# Patient Record
Sex: FEMALE | Race: ASIAN
[De-identification: names, ages, dates, MRNs, and addresses within clinical notes are randomized per-mention and may not be internally consistent; named-entity substitution may affect disease eponyms.]

---

## 2018-01-20 ENCOUNTER — HOSPITAL ENCOUNTER (EMERGENCY)
Dept: HOSPITAL 25 - UCEAST | Age: 63
Discharge: HOME | End: 2018-01-20
Payer: COMMERCIAL
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
## 2018-01-20 DIAGNOSIS — Z90.49: ICD-10-CM

## 2018-01-20 DIAGNOSIS — S60.511A: Primary | ICD-10-CM

## 2018-01-20 DIAGNOSIS — Z23: ICD-10-CM

## 2018-01-20 PROCEDURE — 90715 TDAP VACCINE 7 YRS/> IM: CPT

## 2018-01-20 PROCEDURE — G0463 HOSPITAL OUTPT CLINIC VISIT: HCPCS

## 2018-01-20 PROCEDURE — 99213 OFFICE O/P EST LOW 20 MIN: CPT

## 2018-01-20 NOTE — UC
Bite Injury/Animal HPI





- HPI Summary


HPI Summary: 





Encounter conducted in Mandarin Chinese.  Hx dog bite this evening as she was 

feeding it out of palm of her right hand. This was a [place] davis which is 

very friendly and overexcited about food. Dog has all rabies vaccines up to 

date. Patient denies PMH or medications





- History of Current Complaint


Chief Complaint: UCBiteInjury


Stated Complaint: DOG BITE


Time Seen by Provider: 01/20/18 19:21


Hx Obtained From: Patient


Pregnant?: No


Severity Currently: Mild


Severity Initially: Mild


Pain Scale Used: 0-10 Numeric - 3


Onset/Duration: Sudden Onset


Type of Bite: Animal


Has Animal Been Immunized?: Yes


Character: Abrasion/Laceration


Aggravating Factor(s): Exertion


Alleviating Factor(s): Rest


Hx of Bite: Provoked by: - feeding, accidental


Animal Available for Observation: Yes


Animal Control Notified: No





- Risk Factors


Infection/Sepsis Risk Factors: Negative





- Allergies/Home Medications


Allergies/Adverse Reactions: 


 Allergies











Allergy/AdvReac Type Severity Reaction Status Date / Time


 


No Known Allergies Allergy   Verified 01/20/18 19:07











Home Medications: 


 Home Medications





Omega-3 Fatty Acids [Fish Oil] 1,000 mg PO 01/20/18 [History]











PMH/Surg Hx/FS Hx/Imm Hx


Previously Healthy: Yes





- Surgical History


Surgical History: Yes


Surgery Procedure, Year, and Place: [facility]





- Social History


Alcohol Use: None


Substance Use Type: None


Smoking Status (MU): Never Smoked Tobacco





Review of Systems


Constitutional: Negative


Skin: Other - wound hand


Is Patient Immunocompromised?: No


All Other Systems Reviewed And Are Negative: Yes





Physical Exam


Triage Information Reviewed: Yes


Appearance: Well-Appearing


Vital Signs Reviewed: Yes


Eye Exam: Normal


Neck exam: Normal


Respiratory Exam: Normal


Skin Exam: Other - two abrasions on palm of right hand about 7mm length, no 

bruises.  Distal pulses present, capillary refill less 2 sec





Bite Injury Course/Dx





- Course


Course Of Treatment: keep wound clean, continue bacitracin ointment, take 

antibiotics as prescribed.





- Differential Dx/Diagnosis


Provider Diagnoses: Dog bite.  Abrasion right hand





Discharge





- Discharge Plan


Condition: Stable


Disposition: HOME


Patient Education Materials:  Animal Bite (ED)


Referrals: 


Anju Nicole MD [Primary Care Provider] -

## 2018-05-24 ENCOUNTER — HOSPITAL ENCOUNTER (EMERGENCY)
Dept: HOSPITAL 25 - UCEAST | Age: 63
Discharge: HOME | End: 2018-05-24
Payer: COMMERCIAL

## 2018-05-24 VITALS — SYSTOLIC BLOOD PRESSURE: 151 MMHG | DIASTOLIC BLOOD PRESSURE: 89 MMHG

## 2018-05-24 DIAGNOSIS — S20.161A: Primary | ICD-10-CM

## 2018-05-24 DIAGNOSIS — R03.0: ICD-10-CM
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
## 2018-05-24 PROCEDURE — G0463 HOSPITAL OUTPT CLINIC VISIT: HCPCS

## 2018-05-24 PROCEDURE — 99212 OFFICE O/P EST SF 10 MIN: CPT

## 2018-05-24 NOTE — UC
Skin Complaint HPI





- HPI Summary


HPI Summary: 





63 y/o female presents to the urgent care accompany by  who translates c/

o tick bite under her Rt breast since last night.  She removed tick bite


Pt is concerned about Lyme disease.  Pt had mild HA last night which resolved 

today. Pt denies fever, HA, joint pain, chest pain, abdominal pain, N/V/D, 





- History of Current Complaint


Chief Complaint: UCSkin


Time Seen by Provider: 05/24/18 19:48


Stated Complaint: TICK BITE


Hx Obtained From: Patient, Family/Caretaker -  who transalates at times


Onset/Duration: Sudden Onset, Lasting Days - last night, Resolved


Skin Exposure Onset/Duration: Days Ago


Timing: Constant


Onset Severity: Mild


Current Severity: Mild


Pain Intensity: 1


Pain Scale Used: 0-10 Numeric


Location: Discrete - under Rt breast


Character: Pruritus, Redness


Aggravating Factor(s): Touch


Alleviating Factor(s): Other - removal of tick


Associated Signs & Symptoms: Positive: Rash - around tick bite


Related History: Possible Reaction to: Insect





- Allergy/Home Medications


Allergies/Adverse Reactions: 


 Allergies











Allergy/AdvReac Type Severity Reaction Status Date / Time


 


No Known Allergies Allergy   Verified 05/24/18 19:33














Review of Systems


Constitutional: Negative


Skin: Rash - under Rt breast s/p tick bite


Eyes: Negative


ENT: Negative


Respiratory: Negative


Cardiovascular: Negative


Gastrointestinal: Negative


Genitourinary: Negative


Motor: Negative


Neurovascular: Negative


Musculoskeletal: Negative


Neurological: Negative


Psychological: Negative


Is Patient Immunocompromised?: No


All Other Systems Reviewed And Are Negative: Yes





PMH/Surg Hx/FS Hx/Imm Hx


Previously Healthy: Yes - Pt denies PMHX





- Surgical History


Surgical History: Yes


Surgery Procedure, Year, and Place: Gallbladder





- Family History


Known Family History: Positive: None - Pt denies FMHX





- Social History


Occupation: Unemployed


Lives: With Family


Alcohol Use: None


Substance Use Type: None


Smoking Status (MU): Never Smoked Tobacco





Physical Exam





- Summary


Physical Exam Summary: 





Vital Signs Reviewed: Yes


General: well developed, well nourished female sitting in the examining table w/

o any apparent distress.


Eyes: Positive: Conjunctiva Clear - PERRLA, EOMI


ENT: Positive: Normal ENT inspection, Hearing grossly normal, Pharynx normal, 

TMs normal


Neck: Positive: Supple, Nontender, No Lymphadenopathy


Respiratory: Positive: Chest nontender, Lungs clear, Normal breath sounds


Cardiovascular: Positive: RRR, No Murmur, Pulses Normal


Abdomen Description: Positive: Nontender, No Organomegaly, Soft. Negative: CVA 

Tenderness (R), CVA Tenderness (L)


Bowel Sounds: Positive: Present


Musculoskeletal: Positive: Strength Intact, ROM Intact, No Edema


Neurological Exam: Normal


Psychological Exam: Normal


Skin: Positive: rashes - Under Rt breast with tick bite with surrounding 

erythema, non tender to palpation. tick no longer present, no swelling or 

drainage observed.








Triage Information Reviewed: Yes


Vital Signs: 


 Initial Vital Signs











Temp  98.2 F   05/24/18 19:30


 


Pulse  98   05/24/18 19:30


 


Resp  18   05/24/18 19:30


 


BP  151/89   05/24/18 19:30


 


Pulse Ox  99   05/24/18 19:30














Course/Dx





- Course


Course Of Treatment: 63 y/o female presents to the urgent care accompany by 

 who translates c/o tick bite under her Rt breast since last night.  She 

removed tick bite. Pt is concerned about Lyme disease.  Pt had mild HA last 

night which resolved today. Pt denies fever, HA, joint pain, chest pain, 

abdominal pain, N/V/D. Hx obtained. Pt under Rt breast with tick bite with 

surrounding erythema, non tender to palpation. tick no longer present, no 

swelling or drainage observed on examination. Antibiotic prophylaxis with 

Doxycycline given to the patient to prevent lyme Disease..  Pt tolerated well 

medication. bacitracin oint applied over tick bite and Rx same. Pt advised to 

observe the area for the development or Erythema Migrans for upto 30 days 

following exposure. Advised if he develops fever or erythema Migrans to return 

to the clinic or PCP for further treatment .  and Pt understood and 

agreed with plan of care.  Antibiotic prophylaxis with Doxycycline given to the 

patient to prevent lyme Disease..  Pt tolerated well medication. Pt advised to 

observe the area for the development or Erythema Migrans for upto 30 days 

following exposure. Advised if he develops fever or erythema Migrans to return 

to the clinic or PCP for further treatment . Pt's BP is elevated today advised 

to decrease salt in diet, monitor BP and f/u with PCP for further management. 

Pt understood and agreed with plan of care.





- Differential Diagnoses - Skin Complaint


Differential Diagnoses: Abscess, Cellulitis, Tick Born Illness, Other - insect 

bite or bee sting





- Diagnoses


Provider Diagnoses: 1- Tick bite under RT breast.  2- Elevated BP w/o Hx of HTN





Discharge





- Sign-Out/Discharge


Documenting (check all that apply): Discharge/Admit/Transfer - D/c home





- Discharge Plan


Condition: Stable


Disposition: HOME


Prescriptions: 


Bacitracin OINTMENT* 1 applic TOPICAL BID #1 tube


Patient Education Materials:  Tick Bite (ED), Low-Sodium Diet (ED)


Referrals: 


Anju Nicole MD [Primary Care Provider] - 2 Weeks


Additional Instructions: 


1- Please observe the area for the development or Erythema Migrans for upto 30 

days following exposure. Components of the tick saliva can cause transient 

erythema that should not be confused with Erythema Migrans. If you develop the 

bull's eye rash, fever, joint pains please return to the urgent care or f/u 

with your PCP for further management.


2-Antibiotic prophylaxis with Doxycycline was given to you today to prevent 

lyme Disease. Lyme serology can be drawn in 2 weeks with your PCP to r/o Lyme 

disease since there is probability of negative results at early exposure.


3-Your BP is elevated today. please decrease salt in your diet, monitor BP and 

if it continues to be elevated please f/u with your PCP for further management

















- Billing Disposition and Condition


Condition: STABLE


Disposition: HOME

## 2019-03-23 ENCOUNTER — HOSPITAL ENCOUNTER (EMERGENCY)
Dept: HOSPITAL 25 - ED | Age: 64
Discharge: HOME | End: 2019-03-23
Payer: COMMERCIAL

## 2019-03-23 VITALS — SYSTOLIC BLOOD PRESSURE: 109 MMHG | DIASTOLIC BLOOD PRESSURE: 78 MMHG

## 2019-03-23 DIAGNOSIS — R10.10: ICD-10-CM

## 2019-03-23 DIAGNOSIS — R53.83: ICD-10-CM

## 2019-03-23 DIAGNOSIS — R53.1: ICD-10-CM

## 2019-03-23 DIAGNOSIS — R61: ICD-10-CM

## 2019-03-23 DIAGNOSIS — R06.00: Primary | ICD-10-CM

## 2019-03-23 LAB
ALBUMIN SERPL BCG-MCNC: 4.3 G/DL (ref 3.2–5.2)
ALBUMIN/GLOB SERPL: 1.7 {RATIO} (ref 1–3)
ALP SERPL-CCNC: 97 U/L (ref 34–104)
ALT SERPL W P-5'-P-CCNC: 17 U/L (ref 7–52)
ANION GAP SERPL CALC-SCNC: 7 MMOL/L (ref 2–11)
AST SERPL-CCNC: 17 U/L (ref 13–39)
BASOPHILS # BLD AUTO: 0.1 10^3/UL (ref 0–0.2)
BUN SERPL-MCNC: 13 MG/DL (ref 6–24)
BUN/CREAT SERPL: 19.4 (ref 8–20)
CALCIUM SERPL-MCNC: 9.1 MG/DL (ref 8.6–10.3)
CHLORIDE SERPL-SCNC: 106 MMOL/L (ref 101–111)
EOSINOPHIL # BLD AUTO: 0.1 10^3/UL (ref 0–0.6)
GLOBULIN SER CALC-MCNC: 2.6 G/DL (ref 2–4)
GLUCOSE SERPL-MCNC: 87 MG/DL (ref 70–100)
HCO3 SERPL-SCNC: 25 MMOL/L (ref 22–32)
HCT VFR BLD AUTO: 40 % (ref 33–41)
HGB BLD-MCNC: 13.4 G/DL (ref 12–16)
INR PPP/BLD: 0.85 (ref 0.77–1.02)
LYMPHOCYTES # BLD AUTO: 1.6 10^3/UL (ref 1–4.8)
MCH RBC QN AUTO: 32 PG (ref 27–31)
MCHC RBC AUTO-ENTMCNC: 34 G/DL (ref 31–36)
MCV RBC AUTO: 95 FL (ref 80–97)
MONOCYTES # BLD AUTO: 0.3 10^3/UL (ref 0–0.8)
NEUTROPHILS # BLD AUTO: 3.2 10^3/UL (ref 1.5–7.7)
NRBC # BLD AUTO: 0 10^3/UL
NRBC BLD QL AUTO: 0.1
PLATELET # BLD AUTO: 281 10^3/UL (ref 150–450)
POTASSIUM SERPL-SCNC: 3.7 MMOL/L (ref 3.5–5)
PROT SERPL-MCNC: 6.9 G/DL (ref 6.4–8.9)
RBC # BLD AUTO: 4.21 10^6 /UL (ref 3.7–4.87)
SODIUM SERPL-SCNC: 138 MMOL/L (ref 135–145)
TROPONIN I SERPL-MCNC: 0 NG/ML (ref ?–0.04)
WBC # BLD AUTO: 5.4 10^3/UL (ref 3.5–10.8)

## 2019-03-23 PROCEDURE — 86140 C-REACTIVE PROTEIN: CPT

## 2019-03-23 PROCEDURE — 71046 X-RAY EXAM CHEST 2 VIEWS: CPT

## 2019-03-23 PROCEDURE — 85025 COMPLETE CBC W/AUTO DIFF WBC: CPT

## 2019-03-23 PROCEDURE — 93005 ELECTROCARDIOGRAM TRACING: CPT

## 2019-03-23 PROCEDURE — 80053 COMPREHEN METABOLIC PANEL: CPT

## 2019-03-23 PROCEDURE — 84484 ASSAY OF TROPONIN QUANT: CPT

## 2019-03-23 PROCEDURE — 36415 COLL VENOUS BLD VENIPUNCTURE: CPT

## 2019-03-23 PROCEDURE — 85379 FIBRIN DEGRADATION QUANT: CPT

## 2019-03-23 PROCEDURE — 85610 PROTHROMBIN TIME: CPT

## 2019-03-23 PROCEDURE — 83605 ASSAY OF LACTIC ACID: CPT

## 2019-03-23 PROCEDURE — 99283 EMERGENCY DEPT VISIT LOW MDM: CPT

## 2019-03-23 NOTE — ED
Abdominal Pain/Female





- HPI Summary


HPI Summary: 





This patient is a 63 year old female presenting to [facility] accompanied by 

daughter with a chief complaint of chest/abd pain since 1 week ago. Patient 

states that the chest/abd pain occurred 1 week ago and eventually disappeared. 

However, patient states that she doesnt feel well and is frequently fatigued 

and has SOB. The pain is rated 0/10 in severity currently. Symptoms aggravated 

by nothing. Symptoms alleviated by lying supine. Patient additionally reports 

SOB, weakness, diaphoresis. Patient denies fever, lower extremity swelling. 





- History of Current Complaint


Chief Complaint: EDAbdPain


Stated Complaint: POSS HEART ATTACK PER DAUGHTER


Time Seen by Provider: 03/23/19 11:58


Hx Obtained From: Patient


Onset/Duration: Gradual Onset, Lasting Days, Still Present


Timing: Constant


Severity Currently: Mild


Pain Intensity: 0


Pain Scale Used: 0-10 Numeric


Aggravating Factor(s): Nothing


Alleviating Factor(s): Other: - lying supine


Associated Signs and Symptoms: Positive: Negative - fever, lower extremity 

swelling, Other: - SOB, weakness, diaphoresis


Allergies/Adverse Reactions: 


 Allergies











Allergy/AdvReac Type Severity Reaction Status Date / Time


 


No Known Allergies Allergy   Verified 03/23/19 11:55














PMH/Surg Hx/FS Hx/Imm Hx


Previously Healthy: Yes


Cardiovascular History: Reports: Hx Cardiomegaly


Opthamlomology History: 


   Denies: Hx Legally Blind


EENT History: 


   Denies: Hx Deafness





- Cancer History


Hx Chemotherapy: No


Hx Radiation Therapy: No





- Surgical History


Surgery Procedure, Year, and Place: Gallbladder


Infectious Disease History: No


Infectious Disease History: 


   Denies: Traveled Outside the [place] in Last 30 Days





- Family History


Known Family History: 


   Negative: Hypertension, Diabetes





- Social History


Lives: With Family


Alcohol Use: None


Hx Substance Use: No


Substance Use Type: Reports: None


Hx Tobacco Use: No


Smoking Status (MU): Never Smoked Tobacco





Review of Systems


Positive: Fatigue, Skin Diaphoresis.  Negative: Fever


Positive: Shortness Of Breath


Positive: Abdominal Pain


Negative: Edema


Positive: Weakness


All Other Systems Reviewed And Are Negative: Yes





Physical Exam


Triage Information Reviewed: Yes


Vital Signs On Initial Exam: 


 Initial Vitals











Temp Pulse Resp BP Pulse Ox


 


 97.4 F   80   16   123/86   98 


 


 03/23/19 11:49  03/23/19 11:49  03/23/19 11:49  03/23/19 11:49  03/23/19 11:49











Vital Signs Reviewed: Yes





Diagnostics





- Vital Signs


 Vital Signs











  Temp Pulse Resp BP Pulse Ox


 


 03/23/19 11:49  97.4 F  80  16  123/86  98














- Laboratory


Result Diagrams: 


 03/23/19 12:25





 03/23/19 12:25


Lab Statement: Any lab studies that have been ordered have been reviewed, and 

results considered in the medical decision making process.





- Radiology


  ** CXR


Radiology Interpretation Completed By: Radiologist


Summary of Radiographic Findings: CXR reveals, per radiologist, IMPRESSION: NO 

ACTIVE CARDIOPULMONARY DISEASE. ED physician has reviewed this radiology report.





- EKG


  ** 1221


Cardiac Rate: NL - 69 BPM


Summary of EKG Findings: An EKG, taken 1221, reveals NSR (69 BPM), normal ST, 

no ectopy, no STEMI.





Abdominal Pain Fem Course/Dx





- Course


Course Of Treatment: Ms. Riley presented to the emergency department concerned 

that she had a difficult to characterize pain in her upper abdomen or lower 

chest a week to 10 days ago.  Since that time she has felt short of breath with 

exertion and has had frequent episodes of sudden diaphoresis.  She denies any 

of the symptoms right now.  She she has had no change in her bowel or bladder 

and no upper respiratory tract symptoms.  On presentation she is nontoxic in 

appearance with stable vital signs.  An EKG was performed which shows no acute 

ischemic changes.  She was placed on a monitor and observed while labs were 

obtained and fluid was given.  Her labs were all within normal limits and it's 

unclear to me exactly what's been going on.  I recommended she follow up with 

her PCP who is in [place] although she can't give me the name right now.  I 

recommended she follow up first of the week and the daughter who is translating 

for her agrees to that plan.





- Diagnoses


Provider Diagnoses: 


 Dyspnea








Discharge





- Sign-Out/Discharge


Documenting (check all that apply): Patient Departure


Patient Received Moderate/Deep Sedation with Procedure: No





- Discharge Plan


Condition: Stable


Disposition: HOME


Patient Education Materials:  Dyspnea (ED)


Referrals: 


Anju Nicole MD [Primary Care Provider] - 2 Days


Additional Instructions: 


Return to the ED for any new or worsening symptoms.





- Billing Disposition and Condition


Condition: STABLE


Disposition: Home





- Attestation Statements


Document Initiated by Scribe: Yes


Documenting Scribe: Yoni Sinclair


Provider For Whom Scribe is Documenting (Include Credential): Javon Rudolph MD


Scribe Attestation: 


I, Yoni Sinclair, scribed for Javon Rudolph MD on 03/23/19 at 1617. 


Scribe Documentation Reviewed: Yes


Provider Attestation: 


The documentation as recorded by the Yoni amin accurately reflects 

the service I personally performed and the decisions made by me, Javon Rudolph MD


Status of Scrlaura Document: Viewed